# Patient Record
Sex: FEMALE | Race: WHITE | Employment: PART TIME | URBAN - METROPOLITAN AREA
[De-identification: names, ages, dates, MRNs, and addresses within clinical notes are randomized per-mention and may not be internally consistent; named-entity substitution may affect disease eponyms.]

---

## 2018-02-20 ENCOUNTER — TRANSFERRED RECORDS (OUTPATIENT)
Dept: HEALTH INFORMATION MANAGEMENT | Facility: CLINIC | Age: 58
End: 2018-02-20

## 2018-02-20 ENCOUNTER — RECORDS - HEALTHEAST (OUTPATIENT)
Dept: LAB | Facility: CLINIC | Age: 58
End: 2018-02-20

## 2018-02-22 LAB — BACTERIA SPEC CULT: NO GROWTH

## 2018-03-21 ENCOUNTER — MEDICAL CORRESPONDENCE (OUTPATIENT)
Dept: HEALTH INFORMATION MANAGEMENT | Facility: CLINIC | Age: 58
End: 2018-03-21

## 2018-03-26 ENCOUNTER — CARE COORDINATION (OUTPATIENT)
Dept: NEUROLOGY | Facility: CLINIC | Age: 58
End: 2018-03-26

## 2018-03-26 ENCOUNTER — OFFICE VISIT (OUTPATIENT)
Dept: NEUROLOGY | Facility: CLINIC | Age: 58
End: 2018-03-26
Payer: COMMERCIAL

## 2018-03-26 ENCOUNTER — PRE VISIT (OUTPATIENT)
Dept: NEUROLOGY | Facility: CLINIC | Age: 58
End: 2018-03-26

## 2018-03-26 VITALS
BODY MASS INDEX: 23.41 KG/M2 | WEIGHT: 140.5 LBS | HEART RATE: 72 BPM | HEIGHT: 65 IN | DIASTOLIC BLOOD PRESSURE: 72 MMHG | SYSTOLIC BLOOD PRESSURE: 123 MMHG

## 2018-03-26 DIAGNOSIS — R06.83 SNORING: ICD-10-CM

## 2018-03-26 DIAGNOSIS — R51.9 MORNING HEADACHE: ICD-10-CM

## 2018-03-26 DIAGNOSIS — G44.89 CHRONIC MIXED HEADACHE SYNDROME: ICD-10-CM

## 2018-03-26 DIAGNOSIS — G44.89 CHRONIC MIXED HEADACHE SYNDROME: Primary | ICD-10-CM

## 2018-03-26 DIAGNOSIS — Z72.820 POOR SLEEP: ICD-10-CM

## 2018-03-26 PROBLEM — M81.0 OSTEOPOROSIS: Status: ACTIVE | Noted: 2018-03-26

## 2018-03-26 RX ORDER — AMITRIPTYLINE HYDROCHLORIDE 10 MG/1
10 TABLET ORAL AT BEDTIME
Qty: 30 TABLET | Refills: 3 | Status: SHIPPED | OUTPATIENT
Start: 2018-03-26 | End: 2022-05-23

## 2018-03-26 ASSESSMENT — ENCOUNTER SYMPTOMS
HEADACHES: 1
NECK PAIN: 1
INSOMNIA: 1
ARTHRALGIAS: 1
ALTERED TEMPERATURE REGULATION: 1
DIFFICULTY URINATING: 1
CONSTIPATION: 1

## 2018-03-26 ASSESSMENT — PAIN SCALES - GENERAL: PAINLEVEL: NO PAIN (0)

## 2018-03-26 NOTE — PATIENT INSTRUCTIONS
Plan:  LUZ to get Dr Macias's notes from [facility] in [facility]  Headache log for headache frequency and severity  A trial of amitriptyline 10 mg at bedtime for headache prevention  Drink about full 8-10 + glasses of water per day  Continue to exercise and yoga  Vitamin B2 (riboflavin) 400 mg daily OTC for headache prevention  Sleep study   Follow up in 4-6 weeks or sooner if needed. Please send updates via [employer] as well          Amitriptyline tablets  Brand Names: Elavil, Vanatrip  What is this medicine?  AMITRIPTYLINE (a nila TRIP ti farhad) is used to treat depression.  How should I use this medicine?  Take this medicine by mouth with a drink of water. Follow the directions on the prescription label. You can take the tablets with or without food. Take your medicine at regular intervals. Do not take it more often than directed. Do not stop taking this medicine suddenly except upon the advice of your doctor. Stopping this medicine too quickly may cause serious side effects or your condition may worsen.  A special MedGuide will be given to you by the pharmacist with each prescription and refill. Be sure to read this information carefully each time.  Talk to your pediatrician regarding the use of this medicine in children. Special care may be needed.  What side effects may I notice from receiving this medicine?  Side effects that you should report to your doctor or health care professional as soon as possible:    allergic reactions like skin rash, itching or hives, swelling of the face, lips, or tongue    anxious    breathing problems    changes in vision    confusion    elevated mood, decreased need for sleep, racing thoughts, impulsive behavior    eye pain    fast, irregular heartbeat    feeling faint or lightheaded, falls    feeling agitated, angry, or irritable    fever with increased sweating    hallucination, loss of contact with reality    seizures    stiff muscles    suicidal thoughts or other mood  changes    tingling, pain, or numbness in the feet or hands    trouble passing urine or change in the amount of urine    trouble sleeping    unusually weak or tired    vomiting    yellowing of the eyes or skin  Side effects that usually do not require medical attention (report to your doctor or health care professional if they continue or are bothersome):    change in sex drive or performance    change in appetite or weight    constipation    dizziness    dry mouth    nausea    tired    tremors    upset stomach  What may interact with this medicine?  Do not take this medicine with any of the following medications:    arsenic trioxide    certain medicines used to regulate abnormal heartbeat or to treat other heart conditions    cisapride    droperidol    halofantrine    linezolid    MAOIs like Carbex, Eldepryl, Marplan, Nardil, and Parnate    methylene blue    other medicines for mental depression    phenothiazines like perphenazine, thioridazine and chlorpromazine    pimozide    probucol    procarbazine    sparfloxacin    Kaia's Wort    ziprasidone  This medicine may also interact with the following medications:    atropine and related drugs like hyoscyamine, scopolamine, tolterodine and others    barbiturate medicines for inducing sleep or treating seizures, like phenobarbital    cimetidine    disulfiram    ethchlorvynol    thyroid hormones such as levothyroxine  What if I miss a dose?  If you miss a dose, take it as soon as you can. If it is almost time for your next dose, take only that dose. Do not take double or extra doses.  Where should I keep my medicine?  Keep out of the reach of children.  Store at room temperature between 20 and 25 degrees C (68 and 77 degrees F). Throw away any unused medicine after the expiration date.  What should I tell my health care provider before I take this medicine?  They need to know if you have any of these conditions:    an alcohol problem    asthma, difficulty  breathing    bipolar disorder or schizophrenia    difficulty passing urine, prostate trouble    glaucoma    heart disease or previous heart attack    liver disease    over active thyroid    seizures    thoughts or plans of suicide, a previous suicide attempt, or family history of suicide attempt    an unusual or allergic reaction to amitriptyline, other medicines, foods, dyes, or preservatives    pregnant or trying to get pregnant    breast-feeding  What should I watch for while using this medicine?  Tell your doctor if your symptoms do not get better or if they get worse. Visit your doctor or health care professional for regular checks on your progress. Because it may take several weeks to see the full effects of this medicine, it is important to continue your treatment as prescribed by your doctor.  Patients and their families should watch out for new or worsening thoughts of suicide or depression. Also watch out for sudden changes in feelings such as feeling anxious, agitated, panicky, irritable, hostile, aggressive, impulsive, severely restless, overly excited and hyperactive, or not being able to sleep. If this happens, especially at the beginning of treatment or after a change in dose, call your health care professional.  You may get drowsy or dizzy. Do not drive, use machinery, or do anything that needs mental alertness until you know how this medicine affects you. Do not stand or sit up quickly, especially if you are an older patient. This reduces the risk of dizzy or fainting spells. Alcohol may interfere with the effect of this medicine. Avoid alcoholic drinks.  Do not treat yourself for coughs, colds, or allergies without asking your doctor or health care professional for advice. Some ingredients can increase possible side effects.  Your mouth may get dry. Chewing sugarless gum or sucking hard candy, and drinking plenty of water will help. Contact your doctor if the problem does not go away or is  severe.  This medicine may cause dry eyes and blurred vision. If you wear contact lenses you may feel some discomfort. Lubricating drops may help. See your eye doctor if the problem does not go away or is severe.  This medicine can cause constipation. Try to have a bowel movement at least every 2 to 3 days. If you do not have a bowel movement for 3 days, call your doctor or health care professional.  This medicine can make you more sensitive to the sun. Keep out of the sun. If you cannot avoid being in the sun, wear protective clothing and use sunscreen. Do not use sun lamps or tanning beds/booths.  NOTE:This sheet is a summary. It may not cover all possible information. If you have questions about this medicine, talk to your doctor, pharmacist, or health care provider. Copyright  2017 [employer]

## 2018-03-26 NOTE — LETTER
"3/26/2018       RE: Kaur Navarrete  [address] [facility] [address]     Dear Colleague,    Thank you for referring your patient, Kaur Navarrete, to the [facility] at [facility]. Please see a copy of my visit note below.    Re: Kaur Navarrete      MRN# [id number]  YOB: 1960  Date of Visit:3/26/2018     OUTPATIENT NEUROLOGY VISIT NOTE    Chief Complaint:  Headache evaluation and patient would like to go back on preventive treatment    History of Present Illness  Kaur Navarrete is a 57-year-old female presents to the clinic today for headache evaluation. Referral from [facility].   Patient was seen by Dr [facility] in [facility] about 1-2 years ago and no outside notes available for review today.   Headache History:      Onset History: \"always had headaches\", worse in her teens and continued thru patient's whole life     Current Headache Pattern:      Frequency (How many headache days per month?): about three times  per week or more, at least 12 or more headaches per month for a \"whole life.\" Headache is the same in the past year or so.      Duration of Headache: lasting a whole day (about 8-10 or more hours)     Aura: occasionally \"bolts of light\" occasionally (1-2 times per year for about 10-15 years and not new)  lasting about an hour and \"usually has a headache.\"      Associated Symptoms:  light sensitivity, sound sensitivity and parfumes       Description of Headache Pain & Location:  Starts in the neck and ends up in the forehead and across the forehead and \"band around\", feeling of intense and enough pain to \"ruin the day\" about 3-4 times per week       Level of Pain as headache is starting:  3-4/10 and usually gets worse      Level of Pain during the worst headache:  8/10 may be once per week or less      Do headaches interfere with or prevent usual activities or diminish your productivity at home or work?  Yes - " keeps her away from doing social things.       Headache Treatments Tried: nortriptyline in the past and stopped about 2 years ago   Tramadol for pain and did not work  Tizanidine(possibly) and did not notice much about this  Physical therapy completed and did not help about 2 years ago  Has been taking excedrin or acetaminophen takes about two times per week as needed    Have you needed to utilize the Emergency Room to treat your headache symptoms?  If so, how often and when was the last time used:  No    Are Headaches worsening over time?  No    What makes your headaches better?  dark room    What makes your headaches worse or triggers your headaches? May be some food and smells, gasoline and perfumes, coffee    Reports that she does not sleep good anymore. Sometimes sleeps Ok but not always  Drinks about 2-4 glasses per day  Does not drink coffee   said that she snores but did not have a sleep study  Exercise about 3 times per week-cardio and yoga  Lately a little more stress and starting transition to the new company  Denies any history of anxiety or depression       Denies history of head or neck trauma, dizziness, vertigo, loss of consciousness, seizure, double vision, blurred vision, hearing difficulty, speech or swallowing difficulty, weakness or numbness in face, arms or legs, urinary or bowel incontinence, coordination problems or gait difficulty, fever or chills.    Neurodiagnostic Testing  CT head none  MRI brain about 2 years ago and presumed normal     Past Medical History reviewed and verified with the patient  Chronic headaches  TM perforation  No other significant medical history   Past Surgical History reviewed and verified with the patient  Carpal tunnel surgery  Rectal/hemorrhoids surgery    Family History reviewed and verified with the patient  Some people in the family have headaches-sister and brother and not as much as the patient. Father has headaches.   Mother-has PD and developed in  "her 80s  Father-has dementia  Social History:  Has one daughter and 21 years old, patient works as an  [employer]  Social History   Substance Use Topics     Smoking status: Never Smoker     Smokeless tobacco: Never Used     Alcohol use Not on file    reviewed and verified with the patient   No Known Allergies    Current Outpatient Prescriptions   Medication Sig Dispense Refill     IBUPROFEN PO Take 200 mg by mouth       Acetaminophen (TYLENOL PO) Take 500 mg by mouth     reviewed and verified with the patient    Review of Systems:   A 12-point ROS including constitutional, eyes, ENT, respiratory, cardiovascular, gastroenterology, genitourinary, integumentary, musculoskeletal, neurology, hematology and psychiatric were all reviewed with the patient and constipation, mild hot flashes, menopausal for about 10 years ago completed at [facility] and as mentioned in the HPI.     General Exam:   /72  Pulse 72  Ht 1.638 m (5' 4.5\")  Wt 63.7 kg (140 lb 8 oz)  BMI 23.74 kg/m2  GEN: Awake, NAD; good eye contact, responses appropriately, headache mild today   HEENT: Head atraumatic/Normocephalic. Scalp normal. Pupils equally round, 4 mm, reactive to light and accommodation, sclera and conjunctiva normal. Fundoscopic examination reveals normal vessels no papilledema.   Neck: Easily moveable without resistance  Heart: S1/S2 appreciated, RRR, no m/r/g, no carotid bruits  Lungs:Lungs are clear to auscultation bilaterally, no wheezes or crackles.   Neurological Examination:  The patient is alert and oriented times four. Has good attention and concentration.  Speech is fluent without dysarthria.   Cranial nerves:  CN I deferred.   CN II: Intact and full visual fields to confrontation bilaterally. CN III, IV, VI: EOM intact. There is no nystagmus. Has conjugated gaze. Intact direct and consensual pupillary light reflexes.   CN V: Intact and symmetrical to facial sensation in the " V1 through V3 bilaterally.   CN VII: Intact and symmetrical eyebrow and lid raise and eyelid closure, smiles and frown.   CN VIII: Intact to finger rub bilaterally.   CN IX and X: The palates elevates symmetrical. The uvula is midline.   CN XII: The tongue protrudes midline with no atrophy or fasciculations.   Motor exam: The patient has a normal bulk and tone throughout. There is no atrophy, fasciculations, clonus, or abnormal movements appreciated.   Strength Exam:  5/5 strength at shoulder abduction, elbow flexion or extension, wrist flexion or extension, finger abduction, , hip flexion and extension, knee flexion and extension, and dorsiflexion and plantarflexion bilaterally.   Sensation is intact to light touch and pinprick throughout.   Reflexes are 2+ and symmetrical at biceps, triceps, brachioradialis, patellar, and Achilles.   Negative Babinski with downgoing toes bilaterally.   Coordination reveals finger-nose-finger with normal speed and accuracy.   Station and gait is normal.   Assessment and Plan:  Chronic headache for many years, possibly mixed etiology. Not getting worse for at least a few years. Patient was on preventive treatment in the past and stopped nortriptyline about 2 years ago. Patient is aware of rebound headaches and limits her OTC medications to as needed about 2 days per week. Discussed headache preventive treatment options with the patient. Discussed possible contributing factors such as a poor sleep. Patient reported that her sleeping is worse than used to be and takes longer to fall asleep and some snoring and sometimes wakes up with headaches in the morning.   Non focal exam today    Plan:  LUZ to get Dr Macias's notes from [facility]  Headache log for headache frequency and severity  A trial of amitriptyline 10 mg at bedtime for headache prevention  Drink about full 8-10 + glasses of water per day  Continue to exercise and yoga  Vitamin B2 (riboflavin) 400 mg  daily OTC for headache prevention  Sleep study   Follow up in 4-6 weeks or sooner if needed. Please send updates via [employer] as well    Prescription for amitriptyline provided. Correct use and course provided. Expected benefits and typical side effects reviewed. Safety of concomitant medications and interactions reviewed. Patient taught signs and symptoms of adverse reactions and allergies. Patient understands teaching and accepts risks of prescribed medication regimen.    I discussed all my recommendation with Kaur Navarrete. The patient verbalizes understanding and comfortable with the plan. The patient has our clinic phone number to call with any questions or concerns. All of the patient's questions were answered from the best of my current knowledge.     Thank you for letting me be a part of the treatment team for Kaur Navarrete      Time spent with pt answering questions, discussing findings, counseling and coordinating care was more than 50% the appointment time,  58 minutes.     Again, thank you for allowing me to participate in the care of your patient.      Sincerely,    BAUTISTA Lemus CNP

## 2018-03-26 NOTE — PROGRESS NOTES
"Re: Kaur Navarrete      MRN# [id number]  YOB: 1960  Date of Visit:3/26/2018     OUTPATIENT NEUROLOGY VISIT NOTE    Chief Complaint:  Headache evaluation and patient would like to go back on preventive treatment    History of Present Illness  Kaur Navarrete is a 57-year-old female presents to the clinic today for headache evaluation. Referral from [facility].   Patient was seen by [facility] in [facility] about 1-2 years ago and no outside notes available for review today.   Headache History:      Onset History: \"always had headaches\", worse in her teens and continued thru patient's whole life     Current Headache Pattern:      Frequency (How many headache days per month?): about three times  per week or more, at least 12 or more headaches per month for a \"whole life.\" Headache is the same in the past year or so.      Duration of Headache: lasting a whole day (about 8-10 or more hours)     Aura: occasionally \"bolts of light\" occasionally (1-2 times per year for about 10-15 years and not new)  lasting about an hour and \"usually has a headache.\"      Associated Symptoms:  light sensitivity, sound sensitivity and parfumes       Description of Headache Pain & Location:  Starts in the neck and ends up in the forehead and across the forehead and \"band around\", feeling of intense and enough pain to \"ruin the day\" about 3-4 times per week       Level of Pain as headache is starting:  3-4/10 and usually gets worse      Level of Pain during the worst headache:  8/10 may be once per week or less      Do headaches interfere with or prevent usual activities or diminish your productivity at home or work?  Yes - keeps her away from doing social things.       Headache Treatments Tried: nortriptyline in the past and stopped about 2 years ago   Tramadol for pain and did not work  Tizanidine(possibly) and did not notice much about this  Physical therapy completed and did not help about 2 " years ago  Has been taking excedrin or acetaminophen takes about two times per week as needed    Have you needed to utilize the Emergency Room to treat your headache symptoms?  If so, how often and when was the last time used:  No    Are Headaches worsening over time?  No    What makes your headaches better?  dark room    What makes your headaches worse or triggers your headaches? May be some food and smells, gasoline and perfumes, coffee    Reports that she does not sleep good anymore. Sometimes sleeps Ok but not always  Drinks about 2-4 glasses per day  Does not drink coffee   said that she snores but did not have a sleep study  Exercise about 3 times per week-cardio and yoga  Lately a little more stress and starting transition to the new company  Denies any history of anxiety or depression       Denies history of head or neck trauma, dizziness, vertigo, loss of consciousness, seizure, double vision, blurred vision, hearing difficulty, speech or swallowing difficulty, weakness or numbness in face, arms or legs, urinary or bowel incontinence, coordination problems or gait difficulty, fever or chills.    Neurodiagnostic Testing  CT head none  MRI brain about 2 years ago and presumed normal     Past Medical History reviewed and verified with the patient  Chronic headaches  TM perforation  No other significant medical history   Past Surgical History reviewed and verified with the patient  Carpal tunnel surgery  Rectal/hemorrhoids surgery    Family History reviewed and verified with the patient  Some people in the family have headaches-sister and brother and not as much as the patient. Father has headaches.   Mother-has PD and developed in her 80s  Father-has dementia  Social History:  Has one daughter and 21 years old, patient works as an  [employer]  Social History   Substance Use Topics     Smoking status: Never Smoker     Smokeless tobacco: Never Used     Alcohol use Not on file    reviewed  "and verified with the patient   No Known Allergies    Current Outpatient Prescriptions   Medication Sig Dispense Refill     IBUPROFEN PO Take 200 mg by mouth       Acetaminophen (TYLENOL PO) Take 500 mg by mouth     reviewed and verified with the patient    Review of Systems:   A 12-point ROS including constitutional, eyes, ENT, respiratory, cardiovascular, gastroenterology, genitourinary, integumentary, musculoskeletal, neurology, hematology and psychiatric were all reviewed with the patient and constipation, mild hot flashes, menopausal for about 10 years ago completed [facility] and as mentioned in the HPI.     General Exam:   /72  Pulse 72  Ht 1.638 m (5' 4.5\")  Wt 63.7 kg (140 lb 8 oz)  BMI 23.74 kg/m2  GEN: Awake, NAD; good eye contact, responses appropriately, headache mild today   HEENT: Head atraumatic/Normocephalic. Scalp normal. Pupils equally round, 4 mm, reactive to light and accommodation, sclera and conjunctiva normal. Fundoscopic examination reveals normal vessels no papilledema.   Neck: Easily moveable without resistance  Heart: S1/S2 appreciated, RRR, no m/r/g, no carotid bruits  Lungs:Lungs are clear to auscultation bilaterally, no wheezes or crackles.   Neurological Examination:  The patient is alert and oriented times four. Has good attention and concentration.  Speech is fluent without dysarthria.   Cranial nerves:  CN I deferred.   CN II: Intact and full visual fields to confrontation bilaterally. CN III, IV, VI: EOM intact. There is no nystagmus. Has conjugated gaze. Intact direct and consensual pupillary light reflexes.   CN V: Intact and symmetrical to facial sensation in the V1 through V3 bilaterally.   CN VII: Intact and symmetrical eyebrow and lid raise and eyelid closure, smiles and frown.   CN VIII: Intact to finger rub bilaterally.   CN IX and X: The palates elevates symmetrical. The uvula is midline.   CN XII: The tongue protrudes midline " with no atrophy or fasciculations.   Motor exam: The patient has a normal bulk and tone throughout. There is no atrophy, fasciculations, clonus, or abnormal movements appreciated.   Strength Exam:  5/5 strength at shoulder abduction, elbow flexion or extension, wrist flexion or extension, finger abduction, , hip flexion and extension, knee flexion and extension, and dorsiflexion and plantarflexion bilaterally.   Sensation is intact to light touch and pinprick throughout.   Reflexes are 2+ and symmetrical at biceps, triceps, brachioradialis, patellar, and Achilles.   Negative Babinski with downgoing toes bilaterally.   Coordination reveals finger-nose-finger with normal speed and accuracy.   Station and gait is normal.   Assessment and Plan:  Chronic headache for many years, possibly mixed etiology. Not getting worse for at least a few years. Patient was on preventive treatment in the past and stopped nortriptyline about 2 years ago. Patient is aware of rebound headaches and limits her OTC medications to as needed about 2 days per week. Discussed headache preventive treatment options with the patient. Discussed possible contributing factors such as a poor sleep. Patient reported that her sleeping is worse than used to be and takes longer to fall asleep and some snoring and sometimes wakes up with headaches in the morning.   Non focal exam today    Plan:  LUZ to get Dr [facility]'s notes [facility]  Headache log for headache frequency and severity  A trial of amitriptyline 10 mg at bedtime for headache prevention  Drink about full 8-10 + glasses of water per day  Continue to exercise and yoga  Vitamin B2 (riboflavin) 400 mg daily OTC for headache prevention  Sleep study   Follow up in 4-6 weeks or sooner if needed. Please send updates via [email or web address] as well    Prescription for amitriptyline provided. Correct use and course provided. Expected benefits and typical side effects reviewed. Safety of  concomitant medications and interactions reviewed. Patient taught signs and symptoms of adverse reactions and allergies. Patient understands teaching and accepts risks of prescribed medication regimen.    I discussed all my recommendation with Kaur Navarrete. The patient verbalizes understanding and comfortable with the plan. The patient has our clinic phone number to call with any questions or concerns. All of the patient's questions were answered from the best of my current knowledge.     Thank you for letting me be a part of the treatment team for Kaur Navarrete      Time spent with pt answering questions, discussing findings, counseling and coordinating care was more than 50% the appointment time,  58 minutes.         BAUTISTA York, CNP  [facility]

## 2018-03-26 NOTE — MR AVS SNAPSHOT
After Visit Summary   3/26/2018    Kaur Navarrete    MRN: [id number]           Patient Information     Date Of Birth          1960        Visit Information        Provider Department      3/26/2018 11:30 AM Claire Short APRN CNP [facility]        Today's Diagnoses     Chronic mixed headache syndrome    -  1    Poor sleep        Morning headache        Snoring          Care Instructions    Plan:  LUZ to get Dr Macias's notes from [facility]  Headache log for headache frequency and severity  A trial of amitriptyline 10 mg at bedtime for headache prevention  Drink about full 8-10 + glasses of water per day  Continue to exercise and yoga  Vitamin B2 (riboflavin) 400 mg daily OTC for headache prevention  Sleep study   Follow up in 4-6 weeks or sooner if needed. Please send updates via [employer] as well          Amitriptyline tablets  Brand Names: Elavil, Vanatrip  What is this medicine?  AMITRIPTYLINE (a nila TRIP ti farhad) is used to treat depression.  How should I use this medicine?  Take this medicine by mouth with a drink of water. Follow the directions on the prescription label. You can take the tablets with or without food. Take your medicine at regular intervals. Do not take it more often than directed. Do not stop taking this medicine suddenly except upon the advice of your doctor. Stopping this medicine too quickly may cause serious side effects or your condition may worsen.  A special MedGuide will be given to you by the pharmacist with each prescription and refill. Be sure to read this information carefully each time.  Talk to your pediatrician regarding the use of this medicine in children. Special care may be needed.  What side effects may I notice from receiving this medicine?  Side effects that you should report to your doctor or health care professional as soon as possible:    allergic reactions like skin rash, itching or hives, swelling of  the face, lips, or tongue    anxious    breathing problems    changes in vision    confusion    elevated mood, decreased need for sleep, racing thoughts, impulsive behavior    eye pain    fast, irregular heartbeat    feeling faint or lightheaded, falls    feeling agitated, angry, or irritable    fever with increased sweating    hallucination, loss of contact with reality    seizures    stiff muscles    suicidal thoughts or other mood changes    tingling, pain, or numbness in the feet or hands    trouble passing urine or change in the amount of urine    trouble sleeping    unusually weak or tired    vomiting    yellowing of the eyes or skin  Side effects that usually do not require medical attention (report to your doctor or health care professional if they continue or are bothersome):    change in sex drive or performance    change in appetite or weight    constipation    dizziness    dry mouth    nausea    tired    tremors    upset stomach  What may interact with this medicine?  Do not take this medicine with any of the following medications:    arsenic trioxide    certain medicines used to regulate abnormal heartbeat or to treat other heart conditions    cisapride    droperidol    halofantrine    linezolid    MAOIs like Carbex, Eldepryl, Marplan, Nardil, and Parnate    methylene blue    other medicines for mental depression    phenothiazines like perphenazine, thioridazine and chlorpromazine    pimozide    probucol    procarbazine    sparfloxacin    Kaia's Wort    ziprasidone  This medicine may also interact with the following medications:    atropine and related drugs like hyoscyamine, scopolamine, tolterodine and others    barbiturate medicines for inducing sleep or treating seizures, like phenobarbital    cimetidine    disulfiram    ethchlorvynol    thyroid hormones such as levothyroxine  What if I miss a dose?  If you miss a dose, take it as soon as you can. If it is almost time for your next dose, take  only that dose. Do not take double or extra doses.  Where should I keep my medicine?  Keep out of the reach of children.  Store at room temperature between 20 and 25 degrees C (68 and 77 degrees F). Throw away any unused medicine after the expiration date.  What should I tell my health care provider before I take this medicine?  They need to know if you have any of these conditions:    an alcohol problem    asthma, difficulty breathing    bipolar disorder or schizophrenia    difficulty passing urine, prostate trouble    glaucoma    heart disease or previous heart attack    liver disease    over active thyroid    seizures    thoughts or plans of suicide, a previous suicide attempt, or family history of suicide attempt    an unusual or allergic reaction to amitriptyline, other medicines, foods, dyes, or preservatives    pregnant or trying to get pregnant    breast-feeding  What should I watch for while using this medicine?  Tell your doctor if your symptoms do not get better or if they get worse. Visit your doctor or health care professional for regular checks on your progress. Because it may take several weeks to see the full effects of this medicine, it is important to continue your treatment as prescribed by your doctor.  Patients and their families should watch out for new or worsening thoughts of suicide or depression. Also watch out for sudden changes in feelings such as feeling anxious, agitated, panicky, irritable, hostile, aggressive, impulsive, severely restless, overly excited and hyperactive, or not being able to sleep. If this happens, especially at the beginning of treatment or after a change in dose, call your health care professional.  You may get drowsy or dizzy. Do not drive, use machinery, or do anything that needs mental alertness until you know how this medicine affects you. Do not stand or sit up quickly, especially if you are an older patient. This reduces the risk of dizzy or fainting spells.  Alcohol may interfere with the effect of this medicine. Avoid alcoholic drinks.  Do not treat yourself for coughs, colds, or allergies without asking your doctor or health care professional for advice. Some ingredients can increase possible side effects.  Your mouth may get dry. Chewing sugarless gum or sucking hard candy, and drinking plenty of water will help. Contact your doctor if the problem does not go away or is severe.  This medicine may cause dry eyes and blurred vision. If you wear contact lenses you may feel some discomfort. Lubricating drops may help. See your eye doctor if the problem does not go away or is severe.  This medicine can cause constipation. Try to have a bowel movement at least every 2 to 3 days. If you do not have a bowel movement for 3 days, call your doctor or health care professional.  This medicine can make you more sensitive to the sun. Keep out of the sun. If you cannot avoid being in the sun, wear protective clothing and use sunscreen. Do not use sun lamps or tanning beds/booths.  NOTE:This sheet is a summary. It may not cover all possible information. If you have questions about this medicine, talk to your doctor, pharmacist, or health care provider. Copyright  2017 Elsevier                Follow-ups after your visit        Additional Services     SLEEP EVALUATION & MANAGEMENT REFERRAL - [facility]  [phone] (Age 2 and up)       Please be aware that coverage of these services is subject to the terms and limitations of your health insurance plan.  Call member services at your health plan with any benefit or coverage questions.      Please bring the following to your appointment:    >>   List of current medications   >>   This referral request   >>   Any documents/labs given to you for this referral                      Your next 10 appointments already scheduled     May 22, 2018  8:30 AM CDT   (Arrive by 8:15 AM)   Return  "Visit with BAUTISTA Lemus CNP   [facility] ([facility])    [address]  3rd Floor  [facility] [address] [phone]              Future tests that were ordered for you today     Open Future Orders        Priority Expected Expires Ordered    SLEEP EVALUATION & MANAGEMENT REFERRAL - ADULT -[facility] [place] [facility] clinic  [phone] (Age 2 and up) Routine  3/26/2019 3/26/2018            Who to contact     Please call your clinic at [phone] to:    Ask questions about your health    Make or cancel appointments    Discuss your medicines    Learn about your test results    Speak to your doctor            Additional Information About Your Visit        MyChart Information     StudyMax is an electronic gateway that provides easy, online access to your medical records. With StudyMax, you can request a clinic appointment, read your test results, renew a prescription or communicate with your care team.     To sign up for StudyMax visit the website at [email or web address]   You will be asked to enter the access code listed below, as well as some personal information. Please follow the directions to create your username and password.     Your access code is: [email or web address]  Expires: 2018  6:30 AM     Your access code will  in 90 days. If you need help or a new code, please contact your [facility] or call [phone] for assistance.        Care EveryWhere ID     This is your Care EveryWhere ID. This could be used by other organizations to access your [facility] medical records  [id number]        Your Vitals Were     Pulse Height BMI (Body Mass Index)             72 1.638 m (5' 4.5\") 23.74 kg/m2          Blood Pressure from Last 3 Encounters:   18 123/72    Weight from Last 3 Encounters:   18 63.7 kg (140 lb 8 oz)                 Today's Medication Changes          These " changes are accurate as of 3/26/18  1:06 PM.  If you have any questions, ask your nurse or doctor.               Start taking these medicines.        Dose/Directions    amitriptyline 10 MG tablet   Commonly known as:  ELAVIL   Used for:  Chronic mixed headache syndrome   Started by:  Claire Short APRN CNP        Dose:  10 mg   Take 1 tablet (10 mg) by mouth At Bedtime   Quantity:  30 tablet   Refills:  3            Where to get your medicines      [facility] [address] [place] [address] AT [facility]  [employer]  [address] [place] [id number]     Phone:  [phone]     amitriptyline 10 MG tablet                Primary Care Provider    None Specified       No primary provider on file.        Equal Access to Services     [place] : Vance Quinones, waaxle luqadaha, qaybta kaalmada demarco, nallely castro . [facility] [phone].    ATENCIÓN: Si habla español, tiene a vera disposición servicios gratuitos de asistencia lingüística. Llame al [phone].    We comply with applicable federal civil rights laws and [place] laws. We do not discriminate on the basis of race, color, national origin, age, disability, sex, sexual orientation, or gender identity.            Thank you!     Thank you for choosing [facility]  for your care. Our goal is always to provide you with excellent care. Hearing back from our patients is one way we can continue to improve our services. Please take a few minutes to complete the written survey that you may receive in the mail after your visit with us. Thank you!             Your Updated Medication List - Protect others around you: Learn how to safely use, store and throw away your medicines at www.disposemymeds.org.          This list is accurate as of 3/26/18  1:06 PM.  Always use your most recent med list.                   Brand Name Dispense Instructions for use  Diagnosis    amitriptyline 10 MG tablet    ELAVIL    30 tablet    Take 1 tablet (10 mg) by mouth At Bedtime    Chronic mixed headache syndrome       IBUPROFEN PO      Take 200 mg by mouth        TYLENOL PO      Take 500 mg by mouth

## 2018-03-26 NOTE — PROGRESS NOTES
Claire Short APRN CNP  Magdalene Bahena, QUINN                   Patient was seen by Dr [facility] in [facility] about 1-2 years ago and no outside notes available for review today. Can we get them?   Thank you              3/26/18:  LUZ signed by the patient and faxed to [facility].  [phone]

## 2018-03-28 RX ORDER — AMITRIPTYLINE HYDROCHLORIDE 10 MG/1
TABLET ORAL
Qty: 30 TABLET | Refills: 1 | OUTPATIENT
Start: 2018-03-28

## 2018-04-02 ENCOUNTER — HEALTH MAINTENANCE LETTER (OUTPATIENT)
Age: 58
End: 2018-04-02

## 2018-04-18 ENCOUNTER — OFFICE VISIT (OUTPATIENT)
Dept: SLEEP MEDICINE | Facility: CLINIC | Age: 58
End: 2018-04-18
Attending: NURSE PRACTITIONER
Payer: COMMERCIAL

## 2018-04-18 VITALS
DIASTOLIC BLOOD PRESSURE: 70 MMHG | SYSTOLIC BLOOD PRESSURE: 105 MMHG | HEIGHT: 65 IN | OXYGEN SATURATION: 98 % | RESPIRATION RATE: 16 BRPM | WEIGHT: 140 LBS | BODY MASS INDEX: 23.32 KG/M2 | HEART RATE: 72 BPM

## 2018-04-18 DIAGNOSIS — R06.83 SNORING: Primary | ICD-10-CM

## 2018-04-18 DIAGNOSIS — G47.00 INSOMNIA, UNSPECIFIED TYPE: ICD-10-CM

## 2018-04-18 DIAGNOSIS — Z72.820 POOR SLEEP: ICD-10-CM

## 2018-04-18 DIAGNOSIS — R51.9 MORNING HEADACHE: ICD-10-CM

## 2018-04-18 PROCEDURE — 99203 OFFICE O/P NEW LOW 30 MIN: CPT | Mod: GC

## 2018-04-18 RX ORDER — CODEINE/BUTALBITAL/ASA/CAFFEIN 30-50-325
1 CAPSULE ORAL EVERY 4 HOURS PRN
COMMUNITY
End: 2022-05-23

## 2018-04-18 NOTE — PATIENT INSTRUCTIONS
"[place]  You have symptoms of insomnia and would likely benefit from non-medication approaches to treatment.  Cognitive behavioral treatment (CBT) for insomnia is a very effective technique that involves changing your behaviors and thoughts associated with sleep.  People that are motivated to make changes in their sleep pattern are likely to benefit from internet based cognitive behavioral treatment for insomnia.  Internet CBT programs include but are not limited to [employer] or www.[employer].  There is a fee for using these programs that is similar to actually seeing an insomnia expert.  By entering the code  [place]  it will allow us to know if our patients find these services useful.      Online Programs     www.[employer] (pronounced shut eye). There is a fee for this program. Enter the code  [place]  if you decide to enroll in this program.      www.[employer].com (pronounced sleep ee oh). There is a fee for this program. Enter the code  [place]  if you decide to enroll in this program.     Sleep Recommendations    Please keep sleep logs if they were provided in your clinic visit. Fill them out each morning.    If you are not able to fall asleep for more than 20 minutes, then get up, move to a quiet place and engage in a quiet, relaxing activity.    If you happen to wake up from sleep and are unable to fall asleep within 15 minutes, then get up, move to a quiet place and engage in a quiet, relaxing activity.    Avoid taking naps during the day as this will decrease your ability to naturally fall asleep at your desired bedtime and contribute to shallow sleep.    If you have difficulty \"shutting down your thoughts\" or a tendency to worry excessively, try keeping a journal of your thoughts and worries. Set aside time during the day to do this so that you get into a develop habit. Avoid doing this journaling in the bedroom.    Do not do anything in your bedroom or in bed that is not sleeping. For instance, avoid " watching TV, thinking of work, planning your day, using electronic devices like a iPAD.    Avoid looking at the clock. One suggestion would be to cover the clock or move it so that you cannot see it but can hear the alarm.    A light bedtime snack consisting of protein and carbohydrate may be helpful. Avoid sugary snacks.    Exercise in the late afternoon to raise your core body temperature. If this makes you too awake, try exercising in the morning.    Try keeping the bedroom and sleep environment comfortable. For instance, a cooler temperature tends to be easier to fall asleep than warm    SLEEP HYGIENE TIPS:    1. Don t go to bed unless you are sleepy.   If you are not sleepy at bedtime, then do something else. Read a book, listen to soft music or browse through a magazine. Find something relaxing, but not stimulating, to take your mind off of worries about sleep. This will relax your body and distract your mind.     2. If you are not asleep after 20 minutes, then get out of the bed.   Find something else to do that will make you feel relaxed. If you can, do this in another room. Your bedroom should be where you go to sleep. It is not a place to go when you are bored. Once you feel sleepy again, go back to bed.     3. Begin rituals that help you relax each night before bed.   This can include such things as a warm bath, light snack or a few minutes of reading.     4. Get up at the same time every morning.   Do this even on weekends and holidays.     5. Get a full night s sleep on a regular basis.   Get enough sleep so that you feel well-rested nearly every day.     6. Avoid taking naps if you can.   If you must take a nap, try to keep it short (less than one hour). Never take a nap after 3 p.m.     7. Keep a regular schedule.   Regular times for meals, medications, chores, and other activities help keep the inner body clock running smoothly.     8. Don t read, write, eat, watch TV, talk on the phone, or play  cards in bed.     9. Do not have any caffeine after lunch.     10. Do not have a beer, a glass of wine, or any other alcohol within six hours of your bedtime.     11. Do not have a cigarette or any other source of nicotine before bedtime.     12. Do not go to bed hungry, but don t eat a big meal near bedtime either.     13. Avoid any tough exercise within six hours of your bedtime.   You should exercise on a regular basis, but do it earlier in the day. (Talk to your doctor before you begin an exercise program.)     14. Avoid sleeping pills, or use them cautiously.   Most doctors do not prescribe sleeping pills for periods of more than three weeks. Do not drink alcohol while taking sleeping pills.     15. Try to get rid of or deal with things that make you worry.   If you are unable to do this, then find a time during the day to get all of your worries out of your system. Your bed is a place to rest, not a place to worry.     16. Make your bedroom quiet, dark, and a little bit cool.   An easy way to remember this: it should remind you of a cave. While this may not sound romantic, it seems to work for bats. Bats are champion sleepers. They get about 16 hours of sleep each day. Maybe it s because they sleep in dark, cool caves.

## 2018-04-18 NOTE — PROGRESS NOTES
Sleep Consultation Note:    Date on this visit: 4/18/2018    Kaur Navarrete is sent by Claire BOSS  (neurologist) for a sleep consultation regarding poor sleep and headaches.    Primary Physician: Mary Zarate BEE Navarrete 57 year old  female with PMH chronic headaches and allergies who is here referred by her neurologist for a sleep evaluation as she has daily headaches and poor sleep.      Ms. Navarrete admits she would not have come in to sleep clinic if it was not for her neurologist.  She has been having headaches and intermittent poor sleep for the last 10 years.  Her sleep was much worse 3 years ago when she had trouble going to sleep almost every day.  Now her sleep is much better and she is not experiencing any insomnia on a daily basis but maybe once every 2-3 months that she is up until 3 or 4 AM unable to fall asleep.  This can happen 1 or 2 nights at a time and she can go back to her usual taking 20-30 minutes every night to fall asleep.    Headaches: 3-5 days a week she is bothered by her headaches.  Is being followed by neurology.  Taking amitriptyline 25 mg at bedtime which she thinks is helping with her headaches as well as going to sleep.  Can happen randomly during the day and not necessarily the first thing in the morning.  Headaches do not wake her up from her sleep.  They usually start at the nape of her neck and can spread globally.  Has not tried anything other than nortriptyline and amitriptyline for headaches before.  Today she tells me that she would like to avoid medications if possible and is taking these medications only because her headaches are impacting her quality of life. Has briefly tried acupuncture and massages in the past which did not help much.  No history of TBI or trauma to the head.  No history of seizures.     Denies any mood problems like anxiety/depression/panic.    Sleep schedule: She usually goes to bed around 11 PM and lays there until  1120 or 1130 she thinks, before falling asleep.  Does not use electronics like cell phone or computer in bed.  Does not watch TV In bed.  Reports her bedroom is of good quality for going to sleep as well maintaining. Can wake up 1-2 times a night to use the restroom and can fall back into sleep pretty quickly.  Wakeup time is variable on weekdays it is 730 and on weekends 8:30 AM.  She does not have a headache or had any headaches regularly in the mornings.  Headaches would gradually come on mid day or sporadically during the day.  Moderately well refreshed in the morning.  Not sleepy during the day.  [place] scale today of 4-7.  No sleepiness at work or while driving.    She states she was drinking a glass of wine every night couple of years ago which now recollecting thinks it might have been contributing to her poor sleep as well as headaches.  Now that she is not drinking alcohol every night feels better.    Occasional snoring: Her  notes that she might snore once in a while especially when her allergies are worse.  No witnessed apneic events or choking or gagging in sleep.  She does breathe with her mouth open on the days when she has worse allergies and wakes up with a dry mouth.  No family history of sleep apnea or insomnia or restless legs in the family.  She has never been treated regularly for her nasal congestion.  Does not drink too much of caffeine during the day occasional cup or 2.  No recent weight gain and her BMI today is 23.  She likes to sleep on her sides.  Does not have any symptoms of GERD affecting her sleep at night.  Has had hemorrhoidectomy for which she had anesthesia in the past and did well coming out of it.    Sleep Behaviors  She denies any cataplexy, sleep paralysis, sleep hallucinations.    She denies any night time behaviors - sleep walking, sleep talking, sleep eating.    She does not complain acting out dreams.  Patient denies any injury to oneself or others while  sleeping.    Daytime Functioning  Kaur naps 0-1 times per week for 20-30 minutes, feels refreshed after naps.    She does not doze off during the day     She denies dozing while driving.    Patient's [place] Sleepiness score 7/24 consistent with no daytime sleepiness.      Kaur does not complain of restlessness feelings in the legs.  She does not complain of spontaneous leg movements/jerks in the middle of the night.    Social History  Kaur currently works as .  She does not do shift work currently or in the past.   Maybe a cup of coffee every day.  Does not drink alcohol regularly, used to drink a glass of wine every night in the past.  Patient is a never smoker.  Drug abuse: None, marijuana use for sleep none  Denies any secondhand exposure.  No future surgeries planned.     Allergies:    No Known Allergies    Medications:    Current Outpatient Prescriptions   Medication Sig Dispense Refill     butalbital-aspirin-caffeine-codeine (FIORINAL WITH CODEINE) per capsule Take 1 capsule by mouth every 4 hours as needed for headaches       Acetaminophen (TYLENOL PO) Take 500 mg by mouth       amitriptyline (ELAVIL) 10 MG tablet Take 1 tablet (10 mg) by mouth At Bedtime 30 tablet 3     Cyanocobalamin (VITAMIN B 12 PO) Take 400 mg by mouth       IBUPROFEN PO Take 200 mg by mouth       UNABLE TO FIND MEDICATION NAME: Fish Oil         Problem List:  Patient Active Problem List    Diagnosis Date Noted     Osteoporosis 03/26/2018     Priority: Medium     Overview:   Created by Conversion    Replacement Utility updated for latest IMO load          Past Medical/Surgical History:  No past medical history on file.  No past surgical history on file.   Healthy    Social History:  Social History     Social History     Marital status:      Spouse name: N/A     Number of children: N/A     Years of education: N/A     Occupational History     Not on file.     Social History Main Topics     Smoking  "status: Never Smoker     Smokeless tobacco: Never Used     Alcohol use Not on file     Drug use: Not on file     Sexual activity: Not on file     Other Topics Concern     Not on file     Social History Narrative     No narrative on file       Family History:  Family History   Problem Relation Age of Onset     Colon Cancer Mother      Lymphoma Sister        Review of Systems:  A complete review of systems reviewed by me is negative with the exeption of what has been mentioned in the history of present illness.  CONSTITUTIONAL: NEGATIVE for weight gain/loss, fever, chills, sweats or night sweats, drug allergies.  EYES: NEGATIVE for changes in vision, blind spots, double vision.  ENT: NEGATIVE for ear pain, sore throat, sinus pain, post-nasal drip, runny nose, bloody nose  CARDIAC: NEGATIVE for fast heartbeats or fluttering in chest, chest pain or pressure, breathlessness when lying flat, swollen legs or swollen feet.  NEUROLOGIC: Positive for chronic headaches  DERMATOLOGIC: NEGATIVE for rashes  PULMONARY: NEGATIVE SOB at rest, SOB with activity, dry cough, productive cough, coughing up blood, wheezing or whistling when breathing.    GASTROINTESTINAL: NEGATIVE for nausea or vomitting, loose or watery stools, fat or grease in stools, constipation, abdominal pain, bowel movements black in color or blood noted.  GENITOURINARY: NEGATIVE for pain during urination, blood in urine, urinating more frequently than usual  MUSCULOSKELETAL: NEGATIVE for muscle pain, bone or joint pain, swollen joints.  ENDOCRINE: NEGATIVE for increased thirst or urination, diabetes.  LYMPHATIC: NEGATIVE for swollen lymph nodes, lumps or bumps in the breasts or nipple discharge.  PSYCHE: NEGATIVE  for depression, anxiety    Physical Examination:  Vitals: /70  Pulse 72  Resp 16  Ht 1.638 m (5' 4.5\")  Wt 63.5 kg (140 lb)  SpO2 98%  BMI 23.66 kg/m2  BMI= Body mass index is 23.66 kg/(m^2).    Neck Cir (cm): 34 cm    [place] Total Score " 4/18/2018   Total score - [place] 9       General: No apparent distress, appropriately groomed  Head: Normocephalic, atraumatic  Eyes: no icterus, PERRL  Nose: Nares patent. No exudate/erythema. No septal deviation noted.  Mouth: op pink and moist, teeth: Normal, tongue: Normal,  Orophraynx: Opening is narrowed, uvula: normal   Mallampati Class: II.   Tonsillar Stage: 1  hidden by pillars.  Neck: Supple, Circumference: 34 cm  Cardiac: Regular rate and rhythm  Chest: Symmetric air movement, lungs clear to auscultation bilaterally  GI: Abdomen soft, non-tender, non-distended  Musculoskeletal: No edema noted  Skin: Warm, dry, intact  Psych: Mood pleasant, affect congruent  Neuro:  Mental status: Awake, alert, attentive, oriented.  Cranial nerves: CN2-12 tested and no significant findings appreciated.  Motor: Tone within normal limit  Gait: Normal width, stride length     Impression/Plan:    Snoring  Insomnia complaint  Headaches-improved    Insomnia likely psychophysiologic.  She technically does not meet the definition for chronic insomnia at this point of time as she is not bothered by insomnia 3 nights/week for 3 months. She has intermittent bouts of insomnia which resolve by themselves.     Strongly encouraged to follow good sleep hygiene/behavioral techniques especially on the nights she has trouble going to sleep.  Discussed with her about sleep psychology referral in the future if insomnia becomes a problem or she is having daytime functioning due to insomnia.     Continue to avoid alcohol.    We do not think Ms. Navarrete has underlying sleep apnea that could be contributing to her daily headaches especially considering her [place] score of 2, low BMI, the fact that she does not have consistent snoring or any witnessed apneas, or any daytime sleepiness.  We will consider a sleep study in the future if there is a concern for worsening snoring, apneas at night weight gain, daytime sleepiness.    Patient is a life  long non-smoker and has been encouraged to continue to not smoke.    Encourage healthy diet, and exercise.    Patient was strongly advised to avoid driving, operating any heavy machinery or other hazardous situations while drowsy or sleepy.  Patient was counseled on the importance of driving while alert, to pull over if drowsy, or nap before getting into the vehicle if sleepy.      F/u as needed in the future for any new sleep related concerns/ wt gain, worsening snoring, apneic events concerning for SHYANN.     CC: Claire GAMBOA*    Seen and examined with Dr. Selena Yoon  Sleep medicine fellow    Attending: Patient seen and examined and personally counseled. This note reflects our mutual assessment and plan.  Charisse Walters MD  Pulmonary, Critical Care, and Sleep Medicine

## 2018-04-18 NOTE — LETTER
4/18/2018         RE: Kaur Navarrete  2017 [address][birth year] [address]        Dear Colleague,    Thank you for referring your patient, Kaur Navarrete, to the [facility] [place], SLEEP STUDY. Please see a copy of my visit note below.    Sleep Consultation Note:    Date on this visit: 4/18/2018    Kaur Navarrete is sent by lCaire BOSS  (neurologist) for a sleep consultation regarding poor sleep and headaches.    Primary Physician: Mary Zarate     Kaur Navarrete 57 year old  female with PMH chronic headaches and allergies who is here referred by her neurologist for a sleep evaluation as she has daily headaches and poor sleep.      Ms. Navarrete admits she would not have come in to sleep clinic if it was not for her neurologist.  She has been having headaches and intermittent poor sleep for the last 10 years.  Her sleep was much worse 3 years ago when she had trouble going to sleep almost every day.  Now her sleep is much better and she is not experiencing any insomnia on a daily basis but maybe once every 2-3 months that she is up until 3 or 4 AM unable to fall asleep.  This can happen 1 or 2 nights at a time and she can go back to her usual taking 20-30 minutes every night to fall asleep.    Headaches: 3-5 days a week she is bothered by her headaches.  Is being followed by neurology.  Taking amitriptyline 25 mg at bedtime which she thinks is helping with her headaches as well as going to sleep.  Can happen randomly during the day and not necessarily the first thing in the morning.  Headaches do not wake her up from her sleep.  They usually start at the nape of her neck and can spread globally.  Has not tried anything other than nortriptyline and amitriptyline for headaches before.  Today she tells me that she would like to avoid medications if possible and is taking these medications only because her headaches are impacting her quality of life. Has briefly tried acupuncture and  massages in the past which did not help much.  No history of TBI or trauma to the head.  No history of seizures.     Denies any mood problems like anxiety/depression/panic.    Sleep schedule: She usually goes to bed around 11 PM and lays there until 1120 or 1130 she thinks, before falling asleep.  Does not use electronics like cell phone or computer in bed.  Does not watch TV In bed.  Reports her bedroom is of good quality for going to sleep as well maintaining. Can wake up 1-2 times a night to use the restroom and can fall back into sleep pretty quickly.  Wakeup time is variable on weekdays it is 730 and on weekends 8:30 AM.  She does not have a headache or had any headaches regularly in the mornings.  Headaches would gradually come on mid day or sporadically during the day.  Moderately well refreshed in the morning.  Not sleepy during the day.  [place] scale today of 4-7.  No sleepiness at work or while driving.    She states she was drinking a glass of wine every night couple of years ago which now recollecting thinks it might have been contributing to her poor sleep as well as headaches.  Now that she is not drinking alcohol every night feels better.    Occasional snoring: Her  notes that she might snore once in a while especially when her allergies are worse.  No witnessed apneic events or choking or gagging in sleep.  She does breathe with her mouth open on the days when she has worse allergies and wakes up with a dry mouth.  No family history of sleep apnea or insomnia or restless legs in the family.  She has never been treated regularly for her nasal congestion.  Does not drink too much of caffeine during the day occasional cup or 2.  No recent weight gain and her BMI today is 23.  She likes to sleep on her sides.  Does not have any symptoms of GERD affecting her sleep at night.  Has had hemorrhoidectomy for which she had anesthesia in the past and did well coming out of it.    Sleep Behaviors  She  denies any cataplexy, sleep paralysis, sleep hallucinations.    She denies any night time behaviors - sleep walking, sleep talking, sleep eating.    She does not complain acting out dreams.  Patient denies any injury to oneself or others while sleeping.    Daytime Functioning  Kaur naps 0-1 times per week for 20-30 minutes, feels refreshed after naps.    She does not doze off during the day     She denies dozing while driving.    Patient's [place] Sleepiness score 7/24 consistent with no daytime sleepiness.      Kaur does not complain of restlessness feelings in the legs.  She does not complain of spontaneous leg movements/jerks in the middle of the night.    Social History  Kaur currently works as .  She does not do shift work currently or in the past.   Maybe a cup of coffee every day.  Does not drink alcohol regularly, used to drink a glass of wine every night in the past.  Patient is a never smoker.  Drug abuse: None, marijuana use for sleep none  Denies any secondhand exposure.  No future surgeries planned.     Allergies:    No Known Allergies    Medications:    Current Outpatient Prescriptions   Medication Sig Dispense Refill     butalbital-aspirin-caffeine-codeine (FIORINAL WITH CODEINE) per capsule Take 1 capsule by mouth every 4 hours as needed for headaches       Acetaminophen (TYLENOL PO) Take 500 mg by mouth       amitriptyline (ELAVIL) 10 MG tablet Take 1 tablet (10 mg) by mouth At Bedtime 30 tablet 3     Cyanocobalamin (VITAMIN B 12 PO) Take 400 mg by mouth       IBUPROFEN PO Take 200 mg by mouth       UNABLE TO FIND MEDICATION NAME: Fish Oil         Problem List:  Patient Active Problem List    Diagnosis Date Noted     Osteoporosis 03/26/2018     Priority: Medium     Overview:   Created by Conversion    Replacement Utility updated for latest IMO load          Past Medical/Surgical History:  No past medical history on file.  No past surgical history on file.    Healthy    Social History:  Social History     Social History     Marital status:      Spouse name: N/A     Number of children: N/A     Years of education: N/A     Occupational History     Not on file.     Social History Main Topics     Smoking status: Never Smoker     Smokeless tobacco: Never Used     Alcohol use Not on file     Drug use: Not on file     Sexual activity: Not on file     Other Topics Concern     Not on file     Social History Narrative     No narrative on file       Family History:  Family History   Problem Relation Age of Onset     Colon Cancer Mother      Lymphoma Sister        Review of Systems:  A complete review of systems reviewed by me is negative with the exeption of what has been mentioned in the history of present illness.  CONSTITUTIONAL: NEGATIVE for weight gain/loss, fever, chills, sweats or night sweats, drug allergies.  EYES: NEGATIVE for changes in vision, blind spots, double vision.  ENT: NEGATIVE for ear pain, sore throat, sinus pain, post-nasal drip, runny nose, bloody nose  CARDIAC: NEGATIVE for fast heartbeats or fluttering in chest, chest pain or pressure, breathlessness when lying flat, swollen legs or swollen feet.  NEUROLOGIC: Positive for chronic headaches  DERMATOLOGIC: NEGATIVE for rashes  PULMONARY: NEGATIVE SOB at rest, SOB with activity, dry cough, productive cough, coughing up blood, wheezing or whistling when breathing.    GASTROINTESTINAL: NEGATIVE for nausea or vomitting, loose or watery stools, fat or grease in stools, constipation, abdominal pain, bowel movements black in color or blood noted.  GENITOURINARY: NEGATIVE for pain during urination, blood in urine, urinating more frequently than usual  MUSCULOSKELETAL: NEGATIVE for muscle pain, bone or joint pain, swollen joints.  ENDOCRINE: NEGATIVE for increased thirst or urination, diabetes.  LYMPHATIC: NEGATIVE for swollen lymph nodes, lumps or bumps in the breasts or nipple discharge.  PSYCHE: NEGATIVE   "for depression, anxiety    Physical Examination:  Vitals: /70  Pulse 72  Resp 16  Ht 1.638 m (5' 4.5\")  Wt 63.5 kg (140 lb)  SpO2 98%  BMI 23.66 kg/m2  BMI= Body mass index is 23.66 kg/(m^2).    Neck Cir (cm): 34 cm    Paradox Total Score 4/18/2018   Total score - Paradox 9       General: No apparent distress, appropriately groomed  Head: Normocephalic, atraumatic  Eyes: no icterus, PERRL  Nose: Nares patent. No exudate/erythema. No septal deviation noted.  Mouth: op pink and moist, teeth: Normal, tongue: Normal,  Orophraynx: Opening is narrowed, uvula: normal   Mallampati Class: II.   Tonsillar Stage: 1  hidden by pillars.  Neck: Supple, Circumference: 34 cm  Cardiac: Regular rate and rhythm  Chest: Symmetric air movement, lungs clear to auscultation bilaterally  GI: Abdomen soft, non-tender, non-distended  Musculoskeletal: No edema noted  Skin: Warm, dry, intact  Psych: Mood pleasant, affect congruent  Neuro:  Mental status: Awake, alert, attentive, oriented.  Cranial nerves: CN2-12 tested and no significant findings appreciated.  Motor: Tone within normal limit  Gait: Normal width, stride length     Impression/Plan:    Snoring  Insomnia complaint  Headaches-improved    Insomnia likely psychophysiologic.  She technically does not meet the definition for chronic insomnia at this point of time as she is not bothered by insomnia 3 nights/week for 3 months. She has intermittent bouts of insomnia which resolve by themselves.     Strongly encouraged to follow good sleep hygiene/behavioral techniques especially on the nights she has trouble going to sleep.  Discussed with her about sleep psychology referral in the future if insomnia becomes a problem or she is having daytime functioning due to insomnia.     Continue to avoid alcohol.    We do not think Ms. Navarrete has underlying sleep apnea that could be contributing to her daily headaches especially considering her STOP BANG score of 2, low BMI, the fact that " she does not have consistent snoring or any witnessed apneas, or any daytime sleepiness.  We will consider a sleep study in the future if there is a concern for worsening snoring, apneas at night weight gain, daytime sleepiness.    Patient is a life long non-smoker and has been encouraged to continue to not smoke.    Encourage healthy diet, and exercise.    Patient was strongly advised to avoid driving, operating any heavy machinery or other hazardous situations while drowsy or sleepy.  Patient was counseled on the importance of driving while alert, to pull over if drowsy, or nap before getting into the vehicle if sleepy.      F/u as needed in the future for any new sleep related concerns/ wt gain, worsening snoring, apneic events concerning for SHYANN.     CC: Claire GAMBOA*    Seen and examined with Dr. Selena Yoon  Sleep medicine fellow    Attending: Patient seen and examined and personally counseled. This note reflects our mutual assessment and plan.  Charisse Walters MD  Pulmonary, Critical Care, and Sleep Medicine      Again, thank you for allowing me to participate in the care of your patient.        Sincerely,        Kayla Yoon

## 2018-04-18 NOTE — MR AVS SNAPSHOT
After Visit Summary   4/18/2018    Kaur Navarrete    MRN: [id number]           Patient Information     Date Of Birth          1960        Visit Information        Provider Department      4/18/2018 8:00 AM [facility]        Today's Diagnoses     Poor sleep        Morning headache        Snoring          Care Instructions    Insomia  You have symptoms of insomnia and would likely benefit from non-medication approaches to treatment.  Cognitive behavioral treatment (CBT) for insomnia is a very effective technique that involves changing your behaviors and thoughts associated with sleep.  People that are motivated to make changes in their sleep pattern are likely to benefit from internet based cognitive behavioral treatment for insomnia.  Internet CBT programs include but are not limited to www.[employer] or www.[employer].  There is a fee for using these programs that is similar to actually seeing an insomnia expert.  By entering the code  [place]  it will allow us to know if our patients find these services useful.      Online Programs     www.[employer] (pronounced shut eye). There is a fee for this program. Enter the code  [place]  if you decide to enroll in this program.      www.sleepIO.com (pronounced sleep ee oh). There is a fee for this program. Enter the code  [place]  if you decide to enroll in this program.     Sleep Recommendations    Please keep sleep logs if they were provided in your clinic visit. Fill them out each morning.    If you are not able to fall asleep for more than 20 minutes, then get up, move to a quiet place and engage in a quiet, relaxing activity.    If you happen to wake up from sleep and are unable to fall asleep within 15 minutes, then get up, move to a quiet place and engage in a quiet, relaxing activity.    Avoid taking naps during the day as this will decrease your ability to naturally fall asleep at your desired bedtime and  "contribute to shallow sleep.    If you have difficulty \"shutting down your thoughts\" or a tendency to worry excessively, try keeping a journal of your thoughts and worries. Set aside time during the day to do this so that you get into a develop habit. Avoid doing this journaling in the bedroom.    Do not do anything in your bedroom or in bed that is not sleeping. For instance, avoid watching TV, thinking of work, planning your day, using electronic devices like a iPAD.    Avoid looking at the clock. One suggestion would be to cover the clock or move it so that you cannot see it but can hear the alarm.    A light bedtime snack consisting of protein and carbohydrate may be helpful. Avoid sugary snacks.    Exercise in the late afternoon to raise your core body temperature. If this makes you too awake, try exercising in the morning.    Try keeping the bedroom and sleep environment comfortable. For instance, a cooler temperature tends to be easier to fall asleep than warm    SLEEP HYGIENE TIPS:    1. Don t go to bed unless you are sleepy.   If you are not sleepy at bedtime, then do something else. Read a book, listen to soft music or browse through a magazine. Find something relaxing, but not stimulating, to take your mind off of worries about sleep. This will relax your body and distract your mind.     2. If you are not asleep after 20 minutes, then get out of the bed.   Find something else to do that will make you feel relaxed. If you can, do this in another room. Your bedroom should be where you go to sleep. It is not a place to go when you are bored. Once you feel sleepy again, go back to bed.     3. Begin rituals that help you relax each night before bed.   This can include such things as a warm bath, light snack or a few minutes of reading.     4. Get up at the same time every morning.   Do this even on weekends and holidays.     5. Get a full night s sleep on a regular basis.   Get enough sleep so that you feel " well-rested nearly every day.     6. Avoid taking naps if you can.   If you must take a nap, try to keep it short (less than one hour). Never take a nap after 3 p.m.     7. Keep a regular schedule.   Regular times for meals, medications, chores, and other activities help keep the inner body clock running smoothly.     8. Don t read, write, eat, watch TV, talk on the phone, or play cards in bed.     9. Do not have any caffeine after lunch.     10. Do not have a beer, a glass of wine, or any other alcohol within six hours of your bedtime.     11. Do not have a cigarette or any other source of nicotine before bedtime.     12. Do not go to bed hungry, but don t eat a big meal near bedtime either.     13. Avoid any tough exercise within six hours of your bedtime.   You should exercise on a regular basis, but do it earlier in the day. (Talk to your doctor before you begin an exercise program.)     14. Avoid sleeping pills, or use them cautiously.   Most doctors do not prescribe sleeping pills for periods of more than three weeks. Do not drink alcohol while taking sleeping pills.     15. Try to get rid of or deal with things that make you worry.   If you are unable to do this, then find a time during the day to get all of your worries out of your system. Your bed is a place to rest, not a place to worry.     16. Make your bedroom quiet, dark, and a little bit cool.   An easy way to remember this: it should remind you of a cave. While this may not sound romantic, it seems to work for bats. Bats are champion sleepers. They get about 16 hours of sleep each day. Maybe it s because they sleep in dark, cool caves.                                Follow-ups after your visit        Follow-up notes from your care team     Return if symptoms worsen or fail to improve.      Your next 10 appointments already scheduled     May 22, 2018  8:30 AM CDT   (Arrive by 8:15 AM)   Return Visit with BAUTISTA Lemus CNP  "Health Neurology ([facility] [place])    [address]  3rd Floor  [facility] [address] [phone]              Who to contact     If you have questions or need follow up information about today's clinic visit or your schedule please contact [facility] directly at [phone].  Normal or non-critical lab and imaging results will be communicated to you by MyChart, letter or phone within 4 business days after the clinic has received the results. If you do not hear from us within 7 days, please contact the clinic through [employer] or phone. If you have a critical or abnormal lab result, we will notify you by phone as soon as possible.  Submit refill requests through [employer] or call your pharmacy and they will forward the refill request to us. Please allow 3 business days for your refill to be completed.          Additional Information About Your Visit        [employer] Information     [employer] gives you secure access to your electronic health record. If you see a primary care provider, you can also send messages to your care team and make appointments. If you have questions, please call your primary care clinic.  If you do not have a primary care provider, please call [phone] and they will assist you.        Care EveryWhere ID     This is your Care EveryWhere ID. This could be used by other organizations to access your [facility] medical records  [id number]        Your Vitals Were     Pulse Respirations Height Pulse Oximetry BMI (Body Mass Index)       72 16 1.638 m (5' 4.5\") 98% 23.66 kg/m2        Blood Pressure from Last 3 Encounters:   04/18/18 105/70   03/26/18 123/72    Weight from Last 3 Encounters:   04/18/18 63.5 kg (140 lb)   03/26/18 63.7 kg (140 lb 8 oz)              We Performed the Following     SLEEP EVALUATION & MANAGEMENT REFERRAL - ADULT -[facility]  [phone] (Age 2 and up)       Information about " OPIOIDS     PRESCRIPTION OPIOIDS: WHAT YOU NEED TO KNOW   You have a prescription for an opioid (narcotic) pain medicine. Opioids can cause addiction. If you have a history of chemical dependency of any type, you are at a higher risk of becoming addicted to opioids. Only take this medicine after all other options have been tried. Take it for as short a time and as few doses as possible.     Do not:    Drive. If you drive while taking these medicines, you could be arrested for driving under the influence (DUI).    Operate heavy machinery    Do any other dangerous activities while taking these medicines.     Drink any alcohol while taking these medicines.      Take with any other medicines that contain acetaminophen. Read all labels carefully. Look for the word  acetaminophen  or  Tylenol.  Ask your pharmacist if you have questions or are unsure.    Store your pills in a secure place, locked if possible. We will not replace any lost or stolen medicine. If you don t finish your medicine, please throw away (dispose) as directed by your pharmacist. The [place] Pollution Control Agency has more information about safe disposal: https://www.pca.[facility].[place].us/living-green/managing-unwanted-medications    All opioids tend to cause constipation. Drink plenty of water and eat foods that have a lot of fiber, such as fruits, vegetables, prune juice, apple juice and high-fiber cereal. Take a laxative (Miralax, milk of magnesia, Colace, Senna) if you don t move your bowels at least every other day.          Primary Care Provider Office Phone # Fax #    Mary Zarate [phone]       [facility] [address] [address]        Equal Access to Services     [place] : Hadguilherme Quinones, waaxda luqadaha, qaybta kaalmada nallely pal. So [facility] [phone].    ATENCIÓN: Si habla español, tiene a vera disposición servicios gratuitos de asistencia  lingüística. Rachid al [phone].    We comply with applicable federal civil rights laws and [place] laws. We do not discriminate on the basis of race, color, national origin, age, disability, sex, sexual orientation, or gender identity.            Thank you!     Thank you for choosing [facility], SLEEP STUDY  for your care. Our goal is always to provide you with excellent care. Hearing back from our patients is one way we can continue to improve our services. Please take a few minutes to complete the written survey that you may receive in the mail after your visit with us. Thank you!             Your Updated Medication List - Protect others around you: Learn how to safely use, store and throw away your medicines at www.disposemymeds.org.          This list is accurate as of 4/18/18 10:15 AM.  Always use your most recent med list.                   Brand Name Dispense Instructions for use Diagnosis    amitriptyline 10 MG tablet    ELAVIL    30 tablet    Take 1 tablet (10 mg) by mouth At Bedtime    Chronic mixed headache syndrome       butalbital-aspirin-caffeine-codeine per capsule    FIORINAL WITH codeine     Take 1 capsule by mouth every 4 hours as needed for headaches        IBUPROFEN PO      Take 200 mg by mouth        TYLENOL PO      Take 500 mg by mouth        UNABLE TO FIND      MEDICATION NAME: Fish Oil        VITAMIN B 12 PO      Take 400 mg by mouth

## 2019-02-21 ENCOUNTER — RECORDS - HEALTHEAST (OUTPATIENT)
Dept: LAB | Facility: CLINIC | Age: 59
End: 2019-02-21

## 2019-02-21 LAB
ANION GAP SERPL CALCULATED.3IONS-SCNC: 9 MMOL/L (ref 5–18)
BUN SERPL-MCNC: 17 MG/DL (ref 8–22)
CALCIUM SERPL-MCNC: 9.2 MG/DL (ref 8.5–10.5)
CHLORIDE BLD-SCNC: 105 MMOL/L (ref 98–107)
CHOLEST SERPL-MCNC: 224 MG/DL
CO2 SERPL-SCNC: 27 MMOL/L (ref 22–31)
CREAT SERPL-MCNC: 0.7 MG/DL (ref 0.6–1.1)
FASTING STATUS PATIENT QL REPORTED: ABNORMAL
GFR SERPL CREATININE-BSD FRML MDRD: >60 ML/MIN/1.73M2
GLUCOSE BLD-MCNC: 90 MG/DL (ref 70–125)
HCV AB SERPL QL IA: NEGATIVE
HDLC SERPL-MCNC: 78 MG/DL
LDLC SERPL CALC-MCNC: 137 MG/DL
POTASSIUM BLD-SCNC: 3.9 MMOL/L (ref 3.5–5)
SODIUM SERPL-SCNC: 141 MMOL/L (ref 136–145)
TRIGL SERPL-MCNC: 44 MG/DL

## 2019-02-22 LAB
HPV SOURCE: NORMAL
HUMAN PAPILLOMA VIRUS 16 DNA: NEGATIVE
HUMAN PAPILLOMA VIRUS 18 DNA: NEGATIVE
HUMAN PAPILLOMA VIRUS FINAL DIAGNOSIS: NORMAL
HUMAN PAPILLOMA VIRUS OTHER HR: NEGATIVE
SPECIMEN DESCRIPTION: NORMAL

## 2020-03-11 ENCOUNTER — HEALTH MAINTENANCE LETTER (OUTPATIENT)
Age: 60
End: 2020-03-11

## 2021-01-03 ENCOUNTER — HEALTH MAINTENANCE LETTER (OUTPATIENT)
Age: 61
End: 2021-01-03

## 2021-04-25 ENCOUNTER — HEALTH MAINTENANCE LETTER (OUTPATIENT)
Age: 61
End: 2021-04-25

## 2021-05-25 ENCOUNTER — RECORDS - HEALTHEAST (OUTPATIENT)
Dept: ADMINISTRATIVE | Facility: CLINIC | Age: 61
End: 2021-05-25

## 2021-07-13 ENCOUNTER — RECORDS - HEALTHEAST (OUTPATIENT)
Dept: ADMINISTRATIVE | Facility: CLINIC | Age: 61
End: 2021-07-13

## 2021-07-21 ENCOUNTER — RECORDS - HEALTHEAST (OUTPATIENT)
Dept: ADMINISTRATIVE | Facility: CLINIC | Age: 61
End: 2021-07-21

## 2021-10-10 ENCOUNTER — HEALTH MAINTENANCE LETTER (OUTPATIENT)
Age: 61
End: 2021-10-10

## 2022-05-21 ENCOUNTER — HEALTH MAINTENANCE LETTER (OUTPATIENT)
Age: 62
End: 2022-05-21

## 2022-05-23 ENCOUNTER — OFFICE VISIT (OUTPATIENT)
Dept: FAMILY MEDICINE | Facility: CLINIC | Age: 62
End: 2022-05-23
Payer: COMMERCIAL

## 2022-05-23 VITALS
BODY MASS INDEX: 21.27 KG/M2 | OXYGEN SATURATION: 99 % | DIASTOLIC BLOOD PRESSURE: 66 MMHG | SYSTOLIC BLOOD PRESSURE: 110 MMHG | RESPIRATION RATE: 16 BRPM | HEIGHT: 64 IN | WEIGHT: 124.6 LBS | HEART RATE: 69 BPM | TEMPERATURE: 98 F

## 2022-05-23 DIAGNOSIS — M54.2 CERVICALGIA: ICD-10-CM

## 2022-05-23 DIAGNOSIS — Z00.00 HEALTH CARE MAINTENANCE: Primary | ICD-10-CM

## 2022-05-23 DIAGNOSIS — M79.671 RIGHT FOOT PAIN: ICD-10-CM

## 2022-05-23 DIAGNOSIS — E55.9 VITAMIN D DEFICIENCY: ICD-10-CM

## 2022-05-23 DIAGNOSIS — Z13.228 ENCOUNTER FOR SCREENING FOR OTHER METABOLIC DISORDERS: ICD-10-CM

## 2022-05-23 LAB
ALBUMIN SERPL-MCNC: 4 G/DL (ref 3.5–5)
ALP SERPL-CCNC: 53 U/L (ref 45–120)
ALT SERPL W P-5'-P-CCNC: <9 U/L (ref 0–45)
ANION GAP SERPL CALCULATED.3IONS-SCNC: 8 MMOL/L (ref 5–18)
AST SERPL W P-5'-P-CCNC: 14 U/L (ref 0–40)
BILIRUB SERPL-MCNC: 0.4 MG/DL (ref 0–1)
BUN SERPL-MCNC: 11 MG/DL (ref 8–22)
CALCIUM SERPL-MCNC: 9.2 MG/DL (ref 8.5–10.5)
CHLORIDE BLD-SCNC: 107 MMOL/L (ref 98–107)
CHOLEST SERPL-MCNC: 211 MG/DL
CO2 SERPL-SCNC: 28 MMOL/L (ref 22–31)
CREAT SERPL-MCNC: 0.63 MG/DL (ref 0.6–1.1)
ERYTHROCYTE [DISTWIDTH] IN BLOOD BY AUTOMATED COUNT: 11.9 % (ref 10–15)
FASTING STATUS PATIENT QL REPORTED: YES
GFR SERPL CREATININE-BSD FRML MDRD: >90 ML/MIN/1.73M2
GLUCOSE BLD-MCNC: 83 MG/DL (ref 70–125)
HCT VFR BLD AUTO: 36.4 % (ref 35–47)
HDLC SERPL-MCNC: 86 MG/DL
HGB BLD-MCNC: 12.2 G/DL (ref 11.7–15.7)
LDLC SERPL CALC-MCNC: 117 MG/DL
MCH RBC QN AUTO: 31 PG (ref 26.5–33)
MCHC RBC AUTO-ENTMCNC: 33.5 G/DL (ref 31.5–36.5)
MCV RBC AUTO: 93 FL (ref 78–100)
PLATELET # BLD AUTO: 332 10E3/UL (ref 150–450)
POTASSIUM BLD-SCNC: 4.7 MMOL/L (ref 3.5–5)
PROT SERPL-MCNC: 6.3 G/DL (ref 6–8)
RBC # BLD AUTO: 3.93 10E6/UL (ref 3.8–5.2)
SODIUM SERPL-SCNC: 143 MMOL/L (ref 136–145)
TRIGL SERPL-MCNC: 39 MG/DL
WBC # BLD AUTO: 5 10E3/UL (ref 4–11)

## 2022-05-23 PROCEDURE — 80053 COMPREHEN METABOLIC PANEL: CPT | Performed by: FAMILY MEDICINE

## 2022-05-23 PROCEDURE — 36415 COLL VENOUS BLD VENIPUNCTURE: CPT | Performed by: FAMILY MEDICINE

## 2022-05-23 PROCEDURE — 80061 LIPID PANEL: CPT | Performed by: FAMILY MEDICINE

## 2022-05-23 PROCEDURE — 99386 PREV VISIT NEW AGE 40-64: CPT | Performed by: FAMILY MEDICINE

## 2022-05-23 PROCEDURE — 82306 VITAMIN D 25 HYDROXY: CPT | Performed by: FAMILY MEDICINE

## 2022-05-23 PROCEDURE — 85027 COMPLETE CBC AUTOMATED: CPT | Performed by: FAMILY MEDICINE

## 2022-05-23 RX ORDER — CHOLECALCIFEROL (VITAMIN D3) 50 MCG
TABLET ORAL
COMMUNITY

## 2022-05-23 NOTE — PROGRESS NOTES
FEMALE ADULT PREVENTIVE EXAM    Kaur was seen today for physical.    Diagnoses and all orders for this visit:    Health care maintenance  -     REVIEW OF HEALTH MAINTENANCE PROTOCOL ORDERS  Up to date with pap/ colonoscopy/ immunizations.  She will do mammogram next year.    Vitamin D deficiency  -     Vitamin D Deficiency; Future    Encounter for screening for other metabolic disorders  -     CBC with platelets; Future  -     Comprehensive metabolic panel; Future  -     Lipid panel reflex to direct LDL Fasting; Future    Right foot pain    Cervicalgia    Patient Instructions   For neck pain:  1) Stretching  2) Magnesium glycinate 400 mg daily to relax muscles.    To learn more about ALCAT test go to: [email or web address].  Let me know if you are interested in this testing.    Frequency specific microcurrent treatment for pain:  Dr. Lilly Simmons at [facility].    You can schedule a functional medicine consult in the future.          CHIEF COMPLAINT:  Female preventive exam.    SUBJECTIVE:  Kaur Navarrete is a 61 year old female who presents for her routine physical exam.    Patient would like to address the following concerns today:   1) About a month ago she was frustrated with chronic pain in her right foot.  Pain is under her fore foot.  She tried a grape diet for 10 days.  She did have plantar fasciitis in the past.  She is also trying another program.  She has seen several podiatrists in the past.    2) She will get itchy spots around the anus.  Will also get bumps on her thighs.  She will get burning patches on upper thighs.    3) She has lot of food sensitivities.  She did elimination diet in past.  She had kinesiology testing years ago.  She avoids sugar/ caffeine / wine / MSG.  4) She has history of osteoporosis.  She does not want to take medication.  5) She has ringing in the ears.  6) Has trouble breathing well out of right nostril.  No nasal fracture.  She did see ENT in past.  7) She  has dry eyes and floaters.  8) She has had neck pain off and on.  Pain on sides of neck in back.  No arm pain / numbness or tingling of upper extremities.      GYNE HISTORY  Menses: post menopausal  Sexually Active: no  Last Pap: 2019 normal  Abnormal Pap: no      She  has a past medical history of Arthritis (10 years ago).    Lab Results   Component Value Date     02/21/2019    BUN 17 02/21/2019    CR 0.70 02/21/2019     Lab Results   Component Value Date    CHOL 224 (H) 02/21/2019    HDL 78 02/21/2019    TRIG 44 02/21/2019     No results found for: TSH  BP Readings from Last 3 Encounters:   05/23/22 110/66   04/18/18 105/70   03/26/18 123/72       Surgeries:    Past Surgical History:   Procedure Laterality Date     COLONOSCOPY  2018    next one due at 68 yrs. old     SOFT TISSUE SURGERY  carpel tunnel       Family History:  Her family history includes Anxiety Disorder in her daughter, nephew, and niece; Asthma in her niece; Cerebrovascular Disease in her sister; Colon Cancer in her maternal grandfather and mother; Depression in her daughter, nephew, nephew, nephew, and niece; Hyperlipidemia in her father and mother; Lymphoma in her sister; Osteoporosis in her mother and sister; Other Cancer in her sister.    Social History:  She  reports that she has never smoked. She has never used smokeless tobacco. She reports current alcohol use. She reports that she does not use drugs. Lives with .  Has daughter who is out of house.  Works as  from home part-time.    Medications:    Current Outpatient Medications:      vitamin D3 (CHOLECALCIFEROL) 50 mcg (2000 units) tablet, 1 tab(s), Disp: , Rfl:   HELD MEDICATIONS: None.      Allergies:  No latex allergies.  No Known Allergies         RISK BEHAVIOR & HEALTH HABITS  Regular Exercise: started going to gym/ gardens.  Balanced diet: vegan/ vegetarian.  Calcium/vitamin D: vitamin D3  Stress management: no  Seat Belt Use: yes    Dexa: 2017  "osteoporosis  Colonoscopy: 2019 normal  Mammogram: 2021 normal    Dental Care: YES    Answers for HPI/ROS submitted by the patient on 5/20/2022  Frequency of exercise:: 4-5 days/week  Getting at least 3 servings of Calcium per day:: Yes  Diet:: Vegetarian/vegan, Breakfast skipped  Taking medications regularly:: No  Bi-annual eye exam:: Yes  Dental care twice a year:: Yes  Sleep apnea or symptoms of sleep apnea:: None  Additional concerns today:: Yes  Duration of exercise:: 30-45 minutes  Barriers to taking medications:: None        REVIEW OF SYSTEMS:  Complete head to toe review of systems is otherwise negative except as above.    OBJECTIVE:  VITAL SIGNS:  /66 (BP Location: Left arm, Patient Position: Sitting, Cuff Size: Adult Regular)   Pulse 69   Temp 98  F (36.7  C) (Oral)   Resp 16   Ht 1.613 m (5' 3.5\")   Wt 56.5 kg (124 lb 9.6 oz)   SpO2 99%   BMI 21.73 kg/m    GENERAL:  Patient alert, in no acute distress.  EYES: PERRLA. Extraoccular movements intact, pupils equal, reactive to light and accommodation.  Normal conjunctiva and lids.   ENT:  Hearing grossly normal.  Normal appearance to ears and nose.  Bilateral TM s, external canals, oropharynx normal. Normal lips, gums and teeth.   NECK:  Supple, without thyromegaly or mass. Tender over upper trapezius muscles.  RESP:  Clear to auscultation without crackles, wheezes or distress.  Normal respiratory effort.   CV:  Regular rate and rhythm without murmurs, rubs or gallops.  Normal pedal pulses.  No varicosities or edema.  ABDOMEN:  Soft, non-tender, without hepatosplenomegaly, masses, or hernias.   BREASTS:  Nontender, without masses, nipple discharge, erythema, or axillary adenopathy.    LYMPHATIC: No cervical or axillary lymphadenopathy.  No bruising.  NEURO:  CN II-XII intact, motor & sensory function all intact.  DTR and reflexes normal.  PSYCHIATRIC:  Alert & oriented with normal mood and affect.  Good judgment and insight.  SKIN:  Normal " inspection and palpation.  MUSCULOSKELETAL: Normal gait and station.  - Spine / Ribs / Pelvis: Normal inspection, ROM, stability and strength: Spine, Head, Neck, Upper and Lower Extremities.  Right foot: No deformities.  Tender plantar aspect mid foot.          Alicja Poole MD

## 2022-05-23 NOTE — PATIENT INSTRUCTIONS
For neck pain:  1) Stretching  2) Magnesium glycinate 400 mg daily to relax muscles.    To learn more about ALCAT test go to: [email or web address].  Let me know if you are interested in this testing.    Frequency specific microcurrent treatment for pain:  Dr. Lilly Simmons at [facility].    You can schedule a functional medicine consult in the future.

## 2022-05-24 LAB — DEPRECATED CALCIDIOL+CALCIFEROL SERPL-MC: 39 UG/L (ref 20–75)

## 2022-09-18 ENCOUNTER — HEALTH MAINTENANCE LETTER (OUTPATIENT)
Age: 62
End: 2022-09-18

## 2023-03-29 ENCOUNTER — NURSE TRIAGE (OUTPATIENT)
Dept: NURSING | Facility: CLINIC | Age: 63
End: 2023-03-29
Payer: COMMERCIAL

## 2023-03-29 NOTE — TELEPHONE ENCOUNTER
Triage Call     Pt calling to report that about a week ago her eye was swollen on the bottom lid  She had a little red [place] on the lower lid that had a white spot in the center that appears to be healing and there is no more eyelid swelling.     Disposition: Home Care. Pt was given the care advice.    Reason for Disposition    Sty on eyelid    Additional Information    Negative: Patient sounds very sick or weak to the triager    Negative: [1] Eyelid is red AND [2] fever    Negative: [1] Eyelid is swollen AND [2] fever    Negative: Redness spreads around the eye (both upper and lower eyelid are red)    Negative: [1] Blurred vision AND [2] new or worsening    Negative: 2 or more styes are present    Negative: [1] Eyelid is very swollen AND [2] no fever    Negative: [1] After 5 days of treatment per Sty Care Advice AND [2] not better    Negative: [1] After 10 days of treatment AND [2] not gone away (resolved completely)    Negative: Longstanding or recurring problems with styes    Negative: Nontender lump on eyelid, present > 2 weeks    Protocols used: EULALIA Richmond RN  [facility] Nurse Advisor 10:27 AM 3/29/2023

## 2023-07-30 ENCOUNTER — HEALTH MAINTENANCE LETTER (OUTPATIENT)
Age: 63
End: 2023-07-30

## 2023-08-01 ENCOUNTER — TELEPHONE (OUTPATIENT)
Dept: FAMILY MEDICINE | Facility: CLINIC | Age: 63
End: 2023-08-01
Payer: COMMERCIAL

## 2023-08-01 NOTE — TELEPHONE ENCOUNTER
Patient Quality Outreach    Patient is due for the following:   Physical Annual Wellness Visit    Next Steps:   Schedule a Annual Wellness Visit    NOTE: Dr. Poole is not pt's primary care. First ans last visit with Dr. Poole x 5/23/22. xl    Type of outreach:    Sent [employer] message.      Questions for provider review:    None           Romana Mayo  Chart routed to Care Team.

## 2023-12-20 NOTE — NURSING NOTE
".  Chief Complaint   Patient presents with     Consult     Discuss hard time falling asleep and headaches       Initial /70  Pulse 72  Resp 16  Ht 1.638 m (5' 4.5\")  Wt 63.5 kg (140 lb)  SpO2 98%  BMI 23.66 kg/m2 Estimated body mass index is 23.66 kg/(m^2) as calculated from the following:    Height as of this encounter: 1.638 m (5' 4.5\").    Weight as of this encounter: 63.5 kg (140 lb).  Medication Reconciliation: complete     LAKE Hodges        " Hi,   Patient would like Nulytely sent to the [id number] - [place], [place] - [facility] AT [address] & [address].  Thanks,  Melina

## 2024-09-22 ENCOUNTER — HEALTH MAINTENANCE LETTER (OUTPATIENT)
Age: 64
End: 2024-09-22

## 2025-07-31 ENCOUNTER — LAB REQUISITION (OUTPATIENT)
Dept: LAB | Facility: CLINIC | Age: 65
End: 2025-07-31
Payer: COMMERCIAL

## 2025-07-31 DIAGNOSIS — Z13.220 ENCOUNTER FOR SCREENING FOR LIPOID DISORDERS: ICD-10-CM

## 2025-07-31 DIAGNOSIS — M81.0 AGE-RELATED OSTEOPOROSIS WITHOUT CURRENT PATHOLOGICAL FRACTURE: ICD-10-CM

## 2025-07-31 LAB
ANION GAP SERPL CALCULATED.3IONS-SCNC: 9 MMOL/L (ref 7–15)
BUN SERPL-MCNC: 15.7 MG/DL (ref 8–23)
CALCIUM SERPL-MCNC: 9.3 MG/DL (ref 8.8–10.4)
CHLORIDE SERPL-SCNC: 107 MMOL/L (ref 98–107)
CHOLEST SERPL-MCNC: 216 MG/DL
CREAT SERPL-MCNC: 0.7 MG/DL (ref 0.51–0.95)
EGFRCR SERPLBLD CKD-EPI 2021: >90 ML/MIN/1.73M2
FASTING STATUS PATIENT QL REPORTED: YES
FASTING STATUS PATIENT QL REPORTED: YES
GLUCOSE SERPL-MCNC: 94 MG/DL (ref 70–99)
HCO3 SERPL-SCNC: 25 MMOL/L (ref 22–29)
HDLC SERPL-MCNC: 102 MG/DL
LDLC SERPL CALC-MCNC: 106 MG/DL
NONHDLC SERPL-MCNC: 114 MG/DL
POTASSIUM SERPL-SCNC: 4.8 MMOL/L (ref 3.4–5.3)
SODIUM SERPL-SCNC: 141 MMOL/L (ref 135–145)
TRIGL SERPL-MCNC: 38 MG/DL
TSH SERPL DL<=0.005 MIU/L-ACNC: 2.69 UIU/ML (ref 0.3–4.2)
VIT D+METAB SERPL-MCNC: 30 NG/ML (ref 20–50)

## 2025-07-31 PROCEDURE — 82306 VITAMIN D 25 HYDROXY: CPT | Mod: ORL | Performed by: FAMILY MEDICINE

## 2025-07-31 PROCEDURE — 84443 ASSAY THYROID STIM HORMONE: CPT | Mod: ORL | Performed by: FAMILY MEDICINE

## 2025-07-31 PROCEDURE — 80048 BASIC METABOLIC PNL TOTAL CA: CPT | Mod: ORL | Performed by: FAMILY MEDICINE

## 2025-07-31 PROCEDURE — 80061 LIPID PANEL: CPT | Mod: ORL | Performed by: FAMILY MEDICINE
